# Patient Record
Sex: MALE | Race: WHITE | NOT HISPANIC OR LATINO | Employment: UNEMPLOYED | URBAN - NONMETROPOLITAN AREA
[De-identification: names, ages, dates, MRNs, and addresses within clinical notes are randomized per-mention and may not be internally consistent; named-entity substitution may affect disease eponyms.]

---

## 2019-05-02 ENCOUNTER — OFFICE VISIT (OUTPATIENT)
Dept: GASTROENTEROLOGY | Facility: CLINIC | Age: 49
End: 2019-05-02

## 2019-05-02 ENCOUNTER — HOSPITAL ENCOUNTER (OUTPATIENT)
Facility: HOSPITAL | Age: 49
Setting detail: HOSPITAL OUTPATIENT SURGERY
End: 2019-05-02
Attending: INTERNAL MEDICINE | Admitting: INTERNAL MEDICINE

## 2019-05-02 VITALS
HEIGHT: 69 IN | WEIGHT: 273.6 LBS | SYSTOLIC BLOOD PRESSURE: 142 MMHG | BODY MASS INDEX: 40.52 KG/M2 | HEART RATE: 83 BPM | DIASTOLIC BLOOD PRESSURE: 88 MMHG

## 2019-05-02 DIAGNOSIS — K21.9 GASTROESOPHAGEAL REFLUX DISEASE, ESOPHAGITIS PRESENCE NOT SPECIFIED: Primary | ICD-10-CM

## 2019-05-02 DIAGNOSIS — Z12.11 ENCOUNTER FOR SCREENING COLONOSCOPY: ICD-10-CM

## 2019-05-02 DIAGNOSIS — R10.13 EPIGASTRIC PAIN: ICD-10-CM

## 2019-05-02 PROCEDURE — 99203 OFFICE O/P NEW LOW 30 MIN: CPT | Performed by: NURSE PRACTITIONER

## 2019-05-02 RX ORDER — DEXTROSE AND SODIUM CHLORIDE 5; .45 G/100ML; G/100ML
30 INJECTION, SOLUTION INTRAVENOUS CONTINUOUS PRN
Status: CANCELLED | OUTPATIENT
Start: 2019-10-21

## 2019-05-02 RX ORDER — BENAZEPRIL HYDROCHLORIDE 20 MG/1
20 TABLET ORAL DAILY
COMMUNITY

## 2019-05-02 RX ORDER — OMEPRAZOLE 20 MG/1
CAPSULE, DELAYED RELEASE ORAL
COMMUNITY
Start: 2019-04-15

## 2019-05-02 RX ORDER — ALBUTEROL SULFATE 90 UG/1
2 AEROSOL, METERED RESPIRATORY (INHALATION)
COMMUNITY

## 2019-05-02 RX ORDER — DICLOFENAC SODIUM AND MISOPROSTOL 75; 200 MG/1; UG/1
1 TABLET, DELAYED RELEASE ORAL 2 TIMES DAILY
COMMUNITY

## 2019-05-02 RX ORDER — ATENOLOL 25 MG/1
25 TABLET ORAL DAILY
COMMUNITY

## 2019-05-02 RX ORDER — SIMVASTATIN 40 MG
40 TABLET ORAL NIGHTLY
COMMUNITY

## 2019-05-02 NOTE — PATIENT INSTRUCTIONS
Colorectal Cancer Screening  Colorectal cancer screening is a group of tests used to check for colorectal cancer. Colorectal refers to your colon and rectum. Your colon and rectum are located at the end of your large intestine and carry your bowel movements out of your body.  Why is colorectal cancer screening done?  It is common for abnormal growths (polyps) to form in the lining of your colon, especially as you get older. These polyps can be cancerous or become cancerous. If colorectal cancer is found at an early stage, it is treatable.  Who should be screened for colorectal cancer?  Screening is recommended for all adults at average risk starting at age 50. Tests may be recommended every 1 to 10 years. Your health care provider may recommend earlier or more frequent screening if you have:  · A history of colorectal cancer or polyps.  · A family member with a history of colorectal cancer or polyps.  · Inflammatory bowel disease, such as ulcerative colitis or Crohn disease.  · A type of hereditary colon cancer syndrome.  · Colorectal cancer symptoms.    Types of screening tests  There are several types of colorectal screening tests. They include:  · Guaiac-based fecal occult blood testing.  · Fecal immunochemical test (FIT).  · Stool DNA test.  · Barium enema.  · Virtual colonoscopy.  · Sigmoidoscopy. During this test, a sigmoidoscope is used to examine your rectum and lower colon. A sigmoidoscope is a flexible tube with a camera that is inserted through your anus into your rectum and lower colon.  · Colonoscopy. During this test, a colonoscope is used to examine your entire colon. A colonoscope is a long, thin, flexible tube with a camera. This test examines your entire colon and rectum.    This information is not intended to replace advice given to you by your health care provider. Make sure you discuss any questions you have with your health care provider.  Document Released: 06/07/2011 Document Revised:  07/27/2017 Document Reviewed: 03/26/2015  Elsevier Interactive Patient Education © 2018 [employer]

## 2019-05-02 NOTE — PROGRESS NOTES
Chief Complaint   Patient presents with   • Colon Cancer Screening       Subjective    Sarthak Sanon is a 48 y.o. male. he is being seen for consultation today [facility].    History of Present Illness  48-year-old male presents to discuss screening colonoscopy and chronic reflux.  Has been on omeprazole for some time without full relief of symptoms.  He reports intermittent nausea and epigastric pain.  Denies any melena or hematochezia.  He is a smoker and has tried to cut back.  Denies any family history of colorectal cancer.  Reports bowel movements are normal about 3-5 times per day.  Plan; schedule patient for EGD due to GERD epigastric pain on exam schedule patient for initial screening colonoscopy.       The following portions of the patient's history were reviewed and updated as appropriate:   Past Medical History:   Diagnosis Date   • Asthma    • GERD (gastroesophageal reflux disease)    • Hyperlipidemia    • Hypertension      No past surgical history on file.  No family history on file.    Prior to Admission medications    Medication Sig Start Date End Date Taking? Authorizing Provider   albuterol sulfate HFA (PROVENTIL HFA) 108 (90 Base) MCG/ACT inhaler Inhale 2 puffs.   Yes Charlene Mcguire MD   atenolol (TENORMIN) 25 MG tablet Take 25 mg by mouth Daily.   Yes Charlene Mcguire MD   benazepril (LOTENSIN) 20 MG tablet Take 20 mg by mouth Daily.   Yes Charlene Mcguire MD   diclofenac-misoprostol (ARTHROTEC 75) 75-0.2 MG EC tablet Take 1 tablet by mouth 2 (Two) Times a Day.   Yes Charlene Mcguire MD   omeprazole (priLOSEC) 20 MG capsule  4/15/19  Yes Charlene Mcguire MD   simvastatin (ZOCOR) 40 MG tablet Take 40 mg by mouth Every Night.   Yes Charlene Mcguire MD     Allergies   Allergen Reactions   • Penicillins Hives     Social History     Socioeconomic History   • Marital status:      Spouse name: Not on file   • Number of children: Not on file   •  "Years of education: Not on file   • Highest education level: Not on file   Tobacco Use   • Smoking status: Current Every Day Smoker     Packs/day: 1.50     Years: 20.00     Pack years: 30.00     Types: Cigarettes   • Smokeless tobacco: Never Used   Substance and Sexual Activity   • Alcohol use: No     Frequency: Monthly or less   • Drug use: No   • Sexual activity: No       Review of Systems  Review of Systems   Constitutional: Negative for activity change, appetite change, chills, diaphoresis, fatigue, fever and unexpected weight change.   HENT: Negative for sore throat and trouble swallowing.    Respiratory: Negative for shortness of breath.    Gastrointestinal: Positive for abdominal pain (gerd) and nausea. Negative for abdominal distention, anal bleeding, blood in stool, constipation, diarrhea, rectal pain and vomiting.   Musculoskeletal: Negative for arthralgias.   Skin: Negative for pallor.   Neurological: Negative for light-headedness.        /88 (BP Location: Left arm)   Pulse 83   Ht 175.3 cm (69\")   Wt 124 kg (273 lb 9.6 oz)   BMI 40.40 kg/m²     Objective    Physical Exam   Constitutional: He is oriented to person, place, and time. He appears well-developed and well-nourished. He is cooperative. No distress.   HENT:   Head: Normocephalic and atraumatic.   Neck: Normal range of motion. Neck supple. No thyromegaly present.   Cardiovascular: Normal rate, regular rhythm and normal heart sounds.   Pulmonary/Chest: Effort normal and breath sounds normal. He has no wheezes. He has no rhonchi. He has no rales.   Abdominal: Soft. Normal appearance and bowel sounds are normal. He exhibits no distension. There is no hepatosplenomegaly. There is tenderness in the epigastric area. There is no rigidity and no guarding. No hernia.   Lymphadenopathy:     He has no cervical adenopathy.   Neurological: He is alert and oriented to person, place, and time.   Skin: Skin is warm, dry and intact. No rash noted. No " pallor.   Psychiatric: He has a normal mood and affect. His speech is normal.     No results found for any previous visit.     Assessment/Plan      1. Gastroesophageal reflux disease, esophagitis presence not specified    2. Epigastric pain    3. Encounter for screening colonoscopy    .       Orders placed during this encounter include:  Orders Placed This Encounter   Procedures   • Follow Anesthesia Guidelines / Standing Orders     Standing Status:   Future   • Obtain Informed Consent     Standing Status:   Future     Order Specific Question:   Informed Consent Given For     Answer:   ESOPHAGOGASTRODUODENOSCOPY and Colonoscopy       ESOPHAGOGASTRODUODENOSCOPY (N/A), COLONOSCOPY (N/A)    Review and/or summary of lab tests, radiology, procedures, medications. Review and summary of old records and obtaining of history. The risks and benefits of my recommendations, as well as other treatment options were discussed with the patient today. Questions were answered.    No orders of the defined types were placed in this encounter.      Follow-up: Return in about 4 weeks (around 5/30/2019).          This document has been electronically signed by MALACHI Escamilla on May 2, 2019 8:21 AM             No results found for this or any previous visit.